# Patient Record
Sex: FEMALE | Race: WHITE | Employment: FULL TIME | URBAN - METROPOLITAN AREA
[De-identification: names, ages, dates, MRNs, and addresses within clinical notes are randomized per-mention and may not be internally consistent; named-entity substitution may affect disease eponyms.]

---

## 2017-09-26 ENCOUNTER — HOSPITAL ENCOUNTER (EMERGENCY)
Facility: CLINIC | Age: 27
Discharge: HOME | End: 2017-09-26
Attending: EMERGENCY MEDICINE | Admitting: EMERGENCY MEDICINE
Payer: COMMERCIAL

## 2017-09-26 VITALS
BODY MASS INDEX: 26.13 KG/M2 | TEMPERATURE: 97.7 F | DIASTOLIC BLOOD PRESSURE: 73 MMHG | RESPIRATION RATE: 18 BRPM | SYSTOLIC BLOOD PRESSURE: 120 MMHG | HEIGHT: 62 IN | HEART RATE: 105 BPM | OXYGEN SATURATION: 98 % | WEIGHT: 142 LBS

## 2017-09-26 DIAGNOSIS — T50.901A DRUG OVERDOSE, ACCIDENTAL OR UNINTENTIONAL, INITIAL ENCOUNTER: ICD-10-CM

## 2017-09-26 PROCEDURE — 99282 EMERGENCY DEPT VISIT SF MDM: CPT

## 2017-09-26 ASSESSMENT — ENCOUNTER SYMPTOMS
VOMITING: 0
DYSPHORIC MOOD: 1

## 2017-09-26 NOTE — ED AVS SNAPSHOT
Emergency Department    [address] [place] [address]    Phone:  [phone]    Fax:  [phone]                                       Kelly Wallace   MRN: [id number]    Department:   Emergency Department   Date of Visit:  9/26/2017           After Visit Summary Signature Page     I have received my discharge instructions, and my questions have been answered. I have discussed any challenges I see with this plan with the nurse or doctor.    ..........................................................................................................................................  Patient/Patient Representative Signature      ..........................................................................................................................................  Patient Representative Print Name and Relationship to Patient    ..................................................               ................................................  Date                                            Time    ..........................................................................................................................................  Reviewed by Signature/Title    ...................................................              ..............................................  Date                                                            Time

## 2017-09-26 NOTE — DISCHARGE INSTRUCTIONS
Accidental Ingestion: Nontoxic (Adult)  You have been evaluated and treated for accidentally taking too much of a medicine or swallowing a chemical product. There is no sign of toxic effect at this time. It is not likely that any new symptoms will appear. To be safe, watch for symptoms during the next 24 hours (see below). The symptom will depend on what was swallowed.  Home care    If liquid charcoal was given to neutralize what was swallowed, it will give a black color to the stools for 1 to 2 days. Usually, a laxative is given with charcoal. This speeds the removal of any toxins from the intestines. This may cause diarrhea for up to 24 hours.    If you have been given charcoal but no laxative, you may become constipated. If this occurs, you may take an over-the-counter laxative.  Prevention    Keep medicines, pesticides, and other household chemicals in their original containers.    Clearly lana all harmful products if a different bottle is used.  Note: In the future, if you or someone you know takes something possibly harmful, and you are not sure what to do, call the [employer] Association of Poison Control Centers. The phone number is 1-500.669.3504. The phone line is staffed 24 hours a day. If you call, you will be connected to the poison control center closest to you.  Follow-up care  Follow up with your health care provider, or as advised.  Call [phone]  Contact your local emergency services right away if any of these occur.    Trouble breathing or swallowing, wheezing    Severe confusion    Extreme drowsiness or trouble awakening    Fainting or loss of consciousness    Rapid heart rate    Very slow heart rate    Very low or very high blood pressure    Vomiting blood, or large amounts of blood in stool    Seizure  When to seek medical advice  Call your health care provider right away if any of these occur.    Shakiness    Fast breathing (over 25 breaths per minute) or slow breathing (less than 8 breaths per  minute)    Shortness of breath    Fever of 100.4 F (38 C) or higher, or as directed by your healthcare provider    Vomiting or diarrhea for more than 24 hours    Abdominal pain    Dizziness or weakness  Date Last Reviewed: 4/28/2015 2000-2017 [employer]. [address] [place] [address]. All rights reserved. This information is not intended as a substitute for professional medical care. Always follow your healthcare professional's instructions.

## 2017-09-26 NOTE — LETTER
To Whom it may concern:      Kelly Wallace was seen in our Emergency Department today, 09/26/17.  I expect her condition to improve over the next day.    She may return to work/school when improved.    Sincerely,          MD Dustin

## 2017-09-26 NOTE — ED AVS SNAPSHOT
Emergency Department    [address] [facility] [address]    Phone:  [phone]    Fax:  [phone]                                       Kelly Wallace   MRN: [id number]    Department:   Emergency Department   Date of Visit:  9/26/2017           Patient Information     Date Of Birth          1990        Your diagnoses for this visit were:     Drug overdose, accidental or unintentional, initial encounter        You were seen by Lan Casas MD.      Follow-up Information     Follow up with  Emergency Department Today.    Specialty:  EMERGENCY MEDICINE    Why:  If symptoms worsen    Contact information:    [address] [facility] [id number] [phone]        Discharge Instructions         Accidental Ingestion: Nontoxic (Adult)  You have been evaluated and treated for accidentally taking too much of a medicine or swallowing a chemical product. There is no sign of toxic effect at this time. It is not likely that any new symptoms will appear. To be safe, watch for symptoms during the next 24 hours (see below). The symptom will depend on what was swallowed.  Home care    If liquid charcoal was given to neutralize what was swallowed, it will give a black color to the stools for 1 to 2 days. Usually, a laxative is given with charcoal. This speeds the removal of any toxins from the intestines. This may cause diarrhea for up to 24 hours.    If you have been given charcoal but no laxative, you may become constipated. If this occurs, you may take an over-the-counter laxative.  Prevention    Keep medicines, pesticides, and other household chemicals in their original containers.    Clearly lana all harmful products if a different bottle is used.  Note: In the future, if you or someone you know takes something possibly harmful, and you are not sure what to do, call the American Association of Poison Control Centers. The phone number is 1-680.237.3753. The phone line is  staffed 24 hours a day. If you call, you will be connected to the poison control center closest to you.  Follow-up care  Follow up with your health care provider, or as advised.  Call 911  Contact your local emergency services right away if any of these occur.    Trouble breathing or swallowing, wheezing    Severe confusion    Extreme drowsiness or trouble awakening    Fainting or loss of consciousness    Rapid heart rate    Very slow heart rate    Very low or very high blood pressure    Vomiting blood, or large amounts of blood in stool    Seizure  When to seek medical advice  Call your health care provider right away if any of these occur.    Shakiness    Fast breathing (over 25 breaths per minute) or slow breathing (less than 8 breaths per minute)    Shortness of breath    Fever of 100.4 F (38 C) or higher, or as directed by your healthcare provider    Vomiting or diarrhea for more than 24 hours    Abdominal pain    Dizziness or weakness  Date Last Reviewed: 4/28/2015 2000-2017 [employer]. [address] [place] [address]. All rights reserved. This information is not intended as a substitute for professional medical care. Always follow your healthcare professional's instructions.          24 Hour Appointment Hotline       To make an appointment at any [facility], call [phone] ([phone]). If you don't have a family doctor or clinic, we will help you find one. [facility] clinics are conveniently located to serve the needs of you and your family.             Review of your medicines      Our records show that you are taking the medicines listed below. If these are incorrect, please call your family doctor or clinic.        Dose / Directions Last dose taken    ATIVAN PO        Refills:  0        EFFEXOR PO        Take by mouth 3 times daily   Refills:  0                Orders Needing Specimen Collection     None      Pending Results     No orders found from 9/24/2017 to  9/27/2017.            Pending Culture Results     No orders found from 9/24/2017 to 9/27/2017.            Pending Results Instructions     If you had any lab results that were not finalized at the time of your Discharge, you can call the ED Lab Result RN at [phone]. You will be contacted by this team for any positive Lab results or changes in treatment. The nurses are available 7 days a week from 10A to 6:30P.  You can leave a message 24 hours per day and they will return your call.        Test Results From Your Hospital Stay               Clinical Quality Measure: Blood Pressure Screening     Your blood pressure was checked while you were in the emergency department today. The last reading we obtained was  BP: 131/52 . Please read the guidelines below about what these numbers mean and what you should do about them.  If your systolic blood pressure (the top number) is less than 120 and your diastolic blood pressure (the bottom number) is less than 80, then your blood pressure is normal. There is nothing more that you need to do about it.  If your systolic blood pressure (the top number) is 120-139 or your diastolic blood pressure (the bottom number) is 80-89, your blood pressure may be higher than it should be. You should have your blood pressure rechecked within a year by a primary care provider.  If your systolic blood pressure (the top number) is 140 or greater or your diastolic blood pressure (the bottom number) is 90 or greater, you may have high blood pressure. High blood pressure is treatable, but if left untreated over time it can put you at risk for heart attack, stroke, or kidney failure. You should have your blood pressure rechecked by a primary care provider within the next 4 weeks.  If your provider in the emergency department today gave you specific instructions to follow-up with your doctor or provider even sooner than that, you should follow that instruction and not wait for up to 4 weeks for  "your follow-up visit.        Thank you for choosing [facility]       Thank you for choosing [facility] for your care. Our goal is always to provide you with excellent care. Hearing back from our patients is one way we can continue to improve our services. Please take a few minutes to complete the written survey that you may receive in the mail after you visit with us. Thank you!        Digital ShadowshariPositioning Information     [facility] [facility] lets you send messages to your doctor, view your test results, renew your prescriptions, schedule appointments and more. To sign up, go to [email or web address] . Click on \"Log in\" on the left side of the screen, which will take you to the Welcome page. Then click on \"Sign up Now\" on the right side of the page.     You will be asked to enter the access code listed below, as well as some personal information. Please follow the directions to create your username and password.     Your access code is: [email or web address]  Expires: 2017  4:29 AM     Your access code will  in 90 days. If you need help or a new code, please call your [facility] clinic or [phone].        Care EveryWhere ID     This is your Care EveryWhere ID. This could be used by other organizations to access your [facility] medical records  [id number]        Equal Access to Services     NATHANIEL FINE : Jonelle Crane, waaxda luqadaha, qaybta kaalmada adedonisyada, marilyn hotron. So [facility] [phone].    ATENCIÓN: Si habla español, tiene a adams disposición servicios gratuitos de asistencia lingüística. Llame al [phone].    We comply with applicable federal civil rights laws and [place] laws. We do not discriminate on the basis of race, color, national origin, age, disability sex, sexual orientation or gender identity.            After Visit Summary       This is your record. Keep this with you and show to your community pharmacist(s) and doctor(s) at your next visit.                  "

## 2017-09-26 NOTE — ED PROVIDER NOTES
"  History     Chief Complaint:  Ingestion      HPI   Kelly Wallace is a 27 year old female who presents to the emergency department today for evaluation of ingestion. The patient reports that she took 6 0.5 mg Ativan tonight after drinking 2 bottles of wine around 20 minutes prior to arrival (0200). She states that she wanted to have a good night's sleep. She states that she has taken 3 doses of Ativan in the past without any issues and that drinking more than 2 bottles of wine is not unusual for her. The patient has been on Ativan for several years. The patient denies any suicidal ideation or intent self-harm. She denies vomiting. The patient states that she is struggling with depression due to stress with career but states that she wants to live for her family and pets.     Allergies:  No Known Drug Allergies     Medications:    LORazepam (ATIVAN PO)  Venlafaxine HCl (EFFEXOR PO)    Past Medical History:    Anxiety   Depressive disorder     Past Surgical History:    History reviewed. No pertinent past surgical history.     Family History:    History reviewed. No pertinent family history.     Social History:  The patient was accompanied to the ED by her fiance.  Smoking Status: Current Every Day Smoker  Smokeless Tobacco: Never Used  Alcohol Use: Yes     Review of Systems   Gastrointestinal: Negative for vomiting.   Psychiatric/Behavioral: Positive for dysphoric mood. Negative for self-injury and suicidal ideas.   All other systems reviewed and are negative.    Physical Exam     Physical Exam  Patient Vitals for the past 24 hrs:    BP Temp Temp src Heart Rate Resp SpO2 Height Weight   09/26/17 0230 131/52 97.7  F (36.5  C) Temporal 117 18 99 % 1.575 m (5' 2\") 64.4 kg (142 lb)     General: Alert and Interactive.   Head: No signs of trauma.   Mouth/Throat: Oropharynx is clear and moist.   Eyes: Conjunctivae are normal. Pupils are equal, round, and reactive to light.   Neck: Normal range of motion. No nuchal " rigidity.   CV: Normal rate and regular rhythm.    Resp: Effort normal and breath sounds normal. No respiratory distress.   GI: Soft. There is no tenderness or guarding.   MSK: Normal range of motion. no edema.   Neuro: The patient is alert and oriented to person, place, and time.  PERRLA, EOMI, strength in upper/lower extremities normal and symmetrical.   Sensation normal. Speech normal.  GCS eye subscore is 4. GCS verbal subscore is 5. GCS motor subscore is 6.   Skin: Skin is warm and dry. No rash noted.   Psych: normal mood and affect. behavior is normal. Denies suicidal ideation. Does not appear clinically intoxicated.    Emergency Department Course     Emergency Department Course:  Nursing notes and vitals reviewed.  0401: I performed an exam of the patient as documented above.   I discussed the treatment plan with the patient. They expressed understanding of this plan and consented to discharge. They will be discharged home with instructions for care and follow up. In addition, the patient will return to the emergency department if their symptoms persist, worsen, if new symptoms arise or if there is any concern.  All questions were answered.     Impression & Plan      Medical Decision Making:  Kelly Wallace is a 27 year old female presenting with intentional overdose to not harm herself but in order to sleep. The patient was advised not to use benzodiazepines and alcohol in any form and not to significantly increase her dose of the benzodiazepine without checking with her doctor first. The patient took 3 mg at one time which is not an unheard of dose depending on what she is used to. It sounds like she has been taking benzodiazepines for years. She was observed in the ED for some time with no significant signs of respiratory depression. She is alert and interactive. She does have her fiance with her who can watch her going forward. She will no longer use benzodiazepines and alcohol at the same time.      Diagnosis:    ICD-10-CM    1. Drug overdose, accidental or unintentional, initial encounter T50.901A      Disposition:  discharged to home    Scribe Disclosure:  I, Marge Joshua, am serving as a scribe at 4:06 AM on 9/26/2017 to document services personally performed by Lan aCsas MD based on my observations and the provider's statements to me.    9/26/2017    EMERGENCY DEPARTMENT       Lan Casas MD  09/26/17 8927

## 2017-09-26 NOTE — ED NOTES
Patient reports drinking wine tonight.  Drank 3 bottles with significant other.  Wanted to get good nights sleep so took 6 tabs of 0.5 mg ativan.  Feeling depressed & feels like life is going the way she wants it.  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  Would rather be working with children or animals.  Denies suicidal thoughts.  Reports she is not happy & has thought it would be easier to hurt herself but thinks that would be selfish & she has family that loves her & animals to take care of.  Denies suicidal thoughts at this time. Takes generic Effexor.  Was taking sertaline & bupropion but was not working for her.

## 2020-09-19 ENCOUNTER — APPOINTMENT (OUTPATIENT)
Dept: ULTRASOUND IMAGING | Facility: CLINIC | Age: 30
End: 2020-09-19
Attending: EMERGENCY MEDICINE
Payer: COMMERCIAL

## 2020-09-19 ENCOUNTER — HOSPITAL ENCOUNTER (EMERGENCY)
Facility: CLINIC | Age: 30
Discharge: HOME | End: 2020-09-19
Attending: EMERGENCY MEDICINE | Admitting: EMERGENCY MEDICINE
Payer: COMMERCIAL

## 2020-09-19 VITALS
OXYGEN SATURATION: 97 % | SYSTOLIC BLOOD PRESSURE: 109 MMHG | RESPIRATION RATE: 20 BRPM | DIASTOLIC BLOOD PRESSURE: 72 MMHG | HEART RATE: 79 BPM | TEMPERATURE: 98.4 F

## 2020-09-19 DIAGNOSIS — M54.50 ACUTE RIGHT-SIDED LOW BACK PAIN WITHOUT SCIATICA: ICD-10-CM

## 2020-09-19 LAB
ALBUMIN SERPL-MCNC: 3.5 G/DL (ref 3.4–5)
ALBUMIN UR-MCNC: NEGATIVE MG/DL
ALP SERPL-CCNC: 69 U/L (ref 40–150)
ALT SERPL W P-5'-P-CCNC: 20 U/L (ref 0–50)
ANION GAP SERPL CALCULATED.3IONS-SCNC: 7 MMOL/L (ref 3–14)
APPEARANCE UR: CLEAR
AST SERPL W P-5'-P-CCNC: 14 U/L (ref 0–45)
BACTERIA #/AREA URNS HPF: ABNORMAL /HPF
BASOPHILS # BLD AUTO: 0 10E9/L (ref 0–0.2)
BASOPHILS NFR BLD AUTO: 0.3 %
BILIRUB SERPL-MCNC: 0.3 MG/DL (ref 0.2–1.3)
BILIRUB UR QL STRIP: NEGATIVE
BUN SERPL-MCNC: 10 MG/DL (ref 7–30)
CALCIUM SERPL-MCNC: 8.9 MG/DL (ref 8.5–10.1)
CHLORIDE SERPL-SCNC: 105 MMOL/L (ref 94–109)
CO2 SERPL-SCNC: 27 MMOL/L (ref 20–32)
COLOR UR AUTO: ABNORMAL
CREAT BLD-MCNC: 0.9 MG/DL (ref 0.52–1.04)
CREAT SERPL-MCNC: 0.92 MG/DL (ref 0.52–1.04)
DIFFERENTIAL METHOD BLD: ABNORMAL
EOSINOPHIL # BLD AUTO: 0.1 10E9/L (ref 0–0.7)
EOSINOPHIL NFR BLD AUTO: 1.1 %
ERYTHROCYTE [DISTWIDTH] IN BLOOD BY AUTOMATED COUNT: 12.6 % (ref 10–15)
GFR SERPL CREATININE-BSD FRML MDRD: 73 ML/MIN/{1.73_M2}
GFR SERPL CREATININE-BSD FRML MDRD: 82 ML/MIN/{1.73_M2}
GLUCOSE SERPL-MCNC: 93 MG/DL (ref 70–99)
GLUCOSE UR STRIP-MCNC: NEGATIVE MG/DL
HCG UR QL: NEGATIVE
HCT VFR BLD AUTO: 41 % (ref 35–47)
HGB BLD-MCNC: 13 G/DL (ref 11.7–15.7)
HGB UR QL STRIP: ABNORMAL
IMM GRANULOCYTES # BLD: 0.1 10E9/L (ref 0–0.4)
IMM GRANULOCYTES NFR BLD: 0.5 %
KETONES UR STRIP-MCNC: NEGATIVE MG/DL
LEUKOCYTE ESTERASE UR QL STRIP: NEGATIVE
LIPASE SERPL-CCNC: 130 U/L (ref 73–393)
LYMPHOCYTES # BLD AUTO: 3.7 10E9/L (ref 0.8–5.3)
LYMPHOCYTES NFR BLD AUTO: 29.8 %
MCH RBC QN AUTO: 30.1 PG (ref 26.5–33)
MCHC RBC AUTO-ENTMCNC: 31.7 G/DL (ref 31.5–36.5)
MCV RBC AUTO: 95 FL (ref 78–100)
MONOCYTES # BLD AUTO: 0.6 10E9/L (ref 0–1.3)
MONOCYTES NFR BLD AUTO: 4.8 %
MUCOUS THREADS #/AREA URNS LPF: PRESENT /LPF
NEUTROPHILS # BLD AUTO: 7.8 10E9/L (ref 1.6–8.3)
NEUTROPHILS NFR BLD AUTO: 63.5 %
NITRATE UR QL: NEGATIVE
NRBC # BLD AUTO: 0 10*3/UL
NRBC BLD AUTO-RTO: 0 /100
PH UR STRIP: 5.5 PH (ref 5–7)
PLATELET # BLD AUTO: 216 10E9/L (ref 150–450)
POTASSIUM SERPL-SCNC: 3.8 MMOL/L (ref 3.4–5.3)
PROT SERPL-MCNC: 7.3 G/DL (ref 6.8–8.8)
RBC # BLD AUTO: 4.32 10E12/L (ref 3.8–5.2)
RBC #/AREA URNS AUTO: 1 /HPF (ref 0–2)
SODIUM SERPL-SCNC: 139 MMOL/L (ref 133–144)
SOURCE: ABNORMAL
SP GR UR STRIP: 1 (ref 1–1.03)
SQUAMOUS #/AREA URNS AUTO: <1 /HPF (ref 0–1)
UROBILINOGEN UR STRIP-MCNC: NORMAL MG/DL (ref 0–2)
WBC # BLD AUTO: 12.4 10E9/L (ref 4–11)
WBC #/AREA URNS AUTO: 1 /HPF (ref 0–5)

## 2020-09-19 PROCEDURE — 81025 URINE PREGNANCY TEST: CPT | Performed by: EMERGENCY MEDICINE

## 2020-09-19 PROCEDURE — 76705 ECHO EXAM OF ABDOMEN: CPT

## 2020-09-19 PROCEDURE — 82565 ASSAY OF CREATININE: CPT

## 2020-09-19 PROCEDURE — 85025 COMPLETE CBC W/AUTO DIFF WBC: CPT | Performed by: EMERGENCY MEDICINE

## 2020-09-19 PROCEDURE — 99285 EMERGENCY DEPT VISIT HI MDM: CPT | Mod: 25

## 2020-09-19 PROCEDURE — 96374 THER/PROPH/DIAG INJ IV PUSH: CPT

## 2020-09-19 PROCEDURE — 25000128 H RX IP 250 OP 636: Performed by: EMERGENCY MEDICINE

## 2020-09-19 PROCEDURE — 25800030 ZZH RX IP 258 OP 636: Performed by: EMERGENCY MEDICINE

## 2020-09-19 PROCEDURE — 80053 COMPREHEN METABOLIC PANEL: CPT | Performed by: EMERGENCY MEDICINE

## 2020-09-19 PROCEDURE — 81001 URINALYSIS AUTO W/SCOPE: CPT | Performed by: EMERGENCY MEDICINE

## 2020-09-19 PROCEDURE — 83690 ASSAY OF LIPASE: CPT | Performed by: EMERGENCY MEDICINE

## 2020-09-19 PROCEDURE — 96375 TX/PRO/DX INJ NEW DRUG ADDON: CPT

## 2020-09-19 PROCEDURE — 96361 HYDRATE IV INFUSION ADD-ON: CPT

## 2020-09-19 RX ORDER — HYDROMORPHONE HYDROCHLORIDE 1 MG/ML
0.5 INJECTION, SOLUTION INTRAMUSCULAR; INTRAVENOUS; SUBCUTANEOUS ONCE
Status: COMPLETED | OUTPATIENT
Start: 2020-09-19 | End: 2020-09-19

## 2020-09-19 RX ORDER — KETOROLAC TROMETHAMINE 15 MG/ML
15 INJECTION, SOLUTION INTRAMUSCULAR; INTRAVENOUS ONCE
Status: COMPLETED | OUTPATIENT
Start: 2020-09-19 | End: 2020-09-19

## 2020-09-19 RX ORDER — HYDROCODONE BITARTRATE AND ACETAMINOPHEN 5; 325 MG/1; MG/1
1 TABLET ORAL EVERY 6 HOURS PRN
Qty: 12 TABLET | Refills: 0 | Status: SHIPPED | OUTPATIENT
Start: 2020-09-19 | End: 2020-09-22

## 2020-09-19 RX ADMIN — KETOROLAC TROMETHAMINE 15 MG: 15 INJECTION, SOLUTION INTRAMUSCULAR; INTRAVENOUS at 22:32

## 2020-09-19 RX ADMIN — SODIUM CHLORIDE 1000 ML: 9 INJECTION, SOLUTION INTRAVENOUS at 22:30

## 2020-09-19 RX ADMIN — HYDROMORPHONE HYDROCHLORIDE 0.5 MG: 1 INJECTION, SOLUTION INTRAMUSCULAR; INTRAVENOUS; SUBCUTANEOUS at 22:34

## 2020-09-19 ASSESSMENT — ENCOUNTER SYMPTOMS
NAUSEA: 0
ABDOMINAL PAIN: 1
FEVER: 0
BACK PAIN: 1
HEMATURIA: 0
FREQUENCY: 0
DIFFICULTY URINATING: 0
DYSURIA: 0
VOMITING: 0
CHILLS: 0

## 2020-09-19 NOTE — ED AVS SNAPSHOT
[facility] Emergency Department  [address]  [facility] [address]  Phone:  [phone]  Fax:  [phone]                                    Kelly Wallace   MRN: [id number]    Department:  [facility] Emergency Department   Date of Visit:  9/19/2020           After Visit Summary Signature Page    I have received my discharge instructions, and my questions have been answered. I have discussed any challenges I see with this plan with the nurse or doctor.    ..........................................................................................................................................  Patient/Patient Representative Signature      ..........................................................................................................................................  Patient Representative Print Name and Relationship to Patient    ..................................................               ................................................  Date                                   Time    ..........................................................................................................................................  Reviewed by Signature/Title    ...................................................              ..............................................  Date                                               Time          22EPIC Rev 08/18

## 2020-09-20 NOTE — ED TRIAGE NOTES
Pt states right flank and back pain radiating to abdomen for several days. States seen at [facility] for same and prescribed muscle relaxers without improvement. States pain worse with movement. Pt denies n/v/d. ABCs intact GCS 15

## 2020-09-20 NOTE — ED PROVIDER NOTES
History   Chief Complaint:  Flank Pain    HPI  Kelly Wallace is a 30 year old female who presents with for evaluation of right sided back and abdominal pain. Approximately 8 days ago, the patient reports the gradual onset of right sided back/abdominal pain without any acute injury or movement preceding it. She reports a gradual worsening since the onset and presented to [facility] orthopedics urgent care 5 days ago at which time she reports being told it was likely a pulled muscle. She was prescribed Flexeril, as well as salon pas lidocaine patches, for pain control. She has been using both of these, as well as epsom salt baths and ibuprofen, for the pain without any significant relief. In fact, she feels the muscle relaxant is not helping her pain at all. Today, she reports the pain acutely worsened and she now describes feeling a consistent, sharp pain in her lower back/flank that worsens any time she moves. She states the pain is now radiating up to her right upper quadrant as well. Given this change in symptomatology, she decided to present to the ED for further evaluation. Here, she reports all movements exacerbate this pain but sudden movements, such as coughing, laughing, and sneezing, are especially bad. She denies any midline back pain, nor does she have associated symptoms such as fever, nausea, vomiting, dysuria, hematuria, urinary frequency, vaginal bleeding, or abnormal vaginal discharge. She does report a history of previous back pain issues but states this pain feels different than those episodes. Additionally, she denies any saddle anesthesia, loss of bladder/bowel control, history of IV drug use, hemodialysis, or diabetes.     Allergies:  No Known Allergies    Medications:    Ativan   Effexor   Trazodone  Buspar   Oral contraception   Wellbutrin     Past Medical History:    Anxiety   Depression   Insomnia  Abnormal Pap smear   Alcohol dependence     Past Surgical History:    LEEP procedure    Family  History:    Hypertension   Hyperlipidemia   Uterine fibroids    Social History:  Marital Status: Single.   Presents with roommate.   Positive for tobacco use. Comment: 0.5 PPD.   Positive for alcohol use.  Comment: socially.     Review of Systems   Constitutional: Negative for chills and fever.   Gastrointestinal: Positive for abdominal pain. Negative for nausea and vomiting.   Genitourinary: Negative for difficulty urinating, dysuria, frequency, hematuria, vaginal bleeding and vaginal discharge.   Musculoskeletal: Positive for back pain.   All other systems reviewed and are negative.    Physical Exam     Patient Vitals for the past 24 hrs:   BP Temp Temp src Pulse Resp SpO2   09/19/20 2237 (!) 122/96 -- -- 84 -- 96 %   09/19/20 2200 134/84 98.4  F (36.9  C) Oral 103 20 99 %        Physical Exam    General:  Pleasant, age appropriate.  HEENT:   Conjunctiva are normal and without erythema.    NECK:   Supple, no meningismus.     CV:    Regular rate and rhythm     No murmurs, rubs or gallops.      2+ dorsalis pedis pulses bilateral.  PULM:   Clear to auscultation bilateral.      No respiratory distress.  No stridor.  ABD:   Soft, non-distendend.      Mild focal RUQ tenderness     Negative Harden's sign    No rebound or guarding.    No rigidity  MSK:   Patient is non-tender over the thoracic or lumbar spine.      Moderate tenderness at the left lower thoracic paraspinal musculature.      No step-off to the bony spine or overlying lesions.   LYMPH:  No cervical lymphadenopathy.  NEURO:  Strength is 5/5 and sensation is intact to the lower extremities bilateral.        2+ patellar tendon reflexes bilateral      No clonus and down-going Babinski bilateral.  SKIN:   Warm, dry and intact.    PYSCH:   Mood is good and affect is appropriate.    Emergency Department Course     Imaging:  Radiologic findings were communicated with the patient who voiced understanding of the findings.  US Abdomen, RUQ only:  Normal limited  abdominal ultrasound, as per radiology.     Laboratory:  Laboratory findings were communicated with the patient who voiced understanding of the findings.  CBC: WBC: 12.4 (H), HGB: 13.0, PLT: 216  CMP: All WNL (Creatinine: 0.92)    Lipase: 130    UA with Microscopic: Blood: Trace (A), Bacteria: Moderate (A), Mucous: Present (A) o/w WNL   Urine hCG: Negative    Creatinine POCT: 0.9    Interventions:  2230 NS 1L IV   2232 Toradol, 15 mg, IV   2234 Dilaudid, 0.5 mg, IV injection     Emergency Department Course:  Nursing notes and vitals reviewed.   2209: I performed an exam of the patient as documented above.      IV was inserted and blood was drawn for laboratory testing, results above. Medicine administered as documented above.   The patient provided a urine sample here in the emergency department. This was sent for laboratory testing, findings above.    The patient was sent for a RUQ ultrasound while in the emergency department, results above.      2220: I checked the [place] Prescription Monitoring Program and did not find any recent narcotic prescriptions for the patient.     2335: I rechecked the patient and discussed the results of her workup and recommendations for discharge.     Findings and plan explained to the Patient. Patient discharged home with instructions regarding supportive care, medications, and reasons to return. The importance of close follow-up was reviewed. The patient was prescribed Norco.    I personally reviewed the laboratory and imaging results with the Patient and answered all related questions prior to discharge.    Impression & Plan      Medical Decision Making:   Kelly Wallace is a 30 year old female who presents with right lower thoracic back pain.  Urinalysis without infection and no additional features to draw concern for pyelonephritis.  Low suspicion for renal colic.  No hematuria present to further increase concern for ureteral stone.  Basic laboratory studies were unremarkable.  RUQ  ultrasound undertaken to evaluate for atypical biliary colic given her right upper quadrant tenderness.  Ultrasound negative.  She has no features concerning for cauda equina syndrome, epidural abscess, epidural hematoma or discitis.  I believe symptoms are most likely due to muscle strain with spasm and less likely disc herniation or nerve impingement.  We discussed the risk and benefits of CT scan of the abdomen/pelvis and agree that radiation risk outweighs benefit.  Patient has failed treatment with anti-inflammatories and muscle relaxers.  Limited supply of opioid analgesics and close follow-up PCP.  Return to the ED for any worsening symptoms.    Diagnosis:     ICD-10-CM    1. Acute right-sided low back pain without sciatica  M54.5        Disposition:   Discharged to home.    Discharge Medications:  New Prescriptions    HYDROCODONE-ACETAMINOPHEN (NORCO) 5-325 MG TABLET    Take 1 tablet by mouth every 6 hours as needed for severe pain        Scribe Disclosure:  I, Teresa Juanaleida, am serving as a scribe on 9/19/2020 at 10:09 PM to personally document services performed by Jhon Kramer MD based on my observations and the provider's statements to me.       EMERGENCY DEPARTMENT     Jhon Kramer MD  09/19/20 4450